# Patient Record
Sex: MALE | Race: BLACK OR AFRICAN AMERICAN | URBAN - NONMETROPOLITAN AREA
[De-identification: names, ages, dates, MRNs, and addresses within clinical notes are randomized per-mention and may not be internally consistent; named-entity substitution may affect disease eponyms.]

---

## 2019-09-04 ENCOUNTER — IMPORTED ENCOUNTER (OUTPATIENT)
Dept: URBAN - NONMETROPOLITAN AREA CLINIC 1 | Facility: CLINIC | Age: 59
End: 2019-09-04

## 2019-09-04 PROBLEM — E78.5: Noted: 2019-09-04

## 2019-09-04 PROBLEM — Z01.818: Noted: 2019-09-04

## 2019-09-04 PROBLEM — H25.812: Noted: 2019-09-04

## 2019-09-04 PROBLEM — I10: Noted: 2019-09-04

## 2019-09-04 PROCEDURE — 92004 COMPRE OPH EXAM NEW PT 1/>: CPT

## 2019-09-04 NOTE — PATIENT DISCUSSION
Medical Clearance-Medical clearance done today. -No outstanding concerns that would preclude surgery.-Patient is cleared to proceed with scheduled surgery.

## 2019-09-04 NOTE — PATIENT DISCUSSION
Cataract(s)-Visually significant cataract OU .-Cataract(s) causing symptomatic impairment of visual function not correctable with a tolerable change in glasses or contact lenses lighting or non-operative means resulting in specific activity limitations and/or participation restrictions including but not limited to reading viewing television driving or meeting vocational or recreational needs. -Expectation is clearer vision and functional improvement in symptoms as well as reduced glare disability after cataract removal.-Order IOLMaster and OPD today. -Recommend Standard IOL  based on today's OPD testing and lifestyle questionnaire.-All questions were answered regarding surgery including pre and post-op medications appointments activity restrictions and anesthetic usage.-The risks benefits and alternatives and special risk factors for the patient were discussed in detail including but not limited to: bleeding infection retinal detachment vitreous loss problems with the implant and possible need for additional surgery.-Although rare the possibility of complete vision loss was discussed.-The possible need for glasses post-operatively was discussed.-Order medical clearance exam based on history of -Patient elects to proceed with cataract surgery OS . Will schedule at patient's convenience and re-evaluate OD  in the future. Discussed all lens options in detail with patient and he wishes to proceed with standard IOL.

## 2019-09-17 ENCOUNTER — IMPORTED ENCOUNTER (OUTPATIENT)
Dept: URBAN - NONMETROPOLITAN AREA CLINIC 1 | Facility: CLINIC | Age: 59
End: 2019-09-17

## 2019-09-17 PROBLEM — H25.11: Noted: 2019-09-17

## 2019-09-17 PROBLEM — Z98.42: Noted: 2019-09-17

## 2019-09-17 PROCEDURE — 92012 INTRM OPH EXAM EST PATIENT: CPT

## 2019-09-17 PROCEDURE — 99024 POSTOP FOLLOW-UP VISIT: CPT

## 2019-09-17 NOTE — PATIENT DISCUSSION
Cataract(s)-Visually significant cataract OD . -Cataract(s) causing symptomatic impairment of visual function not correctable with a tolerable change in glasses or contact lenses lighting or non-operative means resulting in specific activity limitations and/or participation restrictions including but not limited to reading viewing television driving or meeting vocational or recreational needs. -Expectation is clearer vision and functional improvement in symptoms as well as reduced glare disability after cataract removal.-Recommend Standard/Traditional based on previous OPD testing and lifestyle questionnaire.-All questions were answered regarding surgery including pre and post-op medications appointments activity restrictions and anesthetic usage.-The risks benefits and alternatives and special risk factors for the patient were discussed in detail including but not limited to: bleeding infection retinal detachment vitreous loss problems with the implant and possible need for additional surgery.-Although rare the possibility of complete vision loss was discussed.-The need for glasses post-operatively was discussed.-Patient elects to proceed with cataract surgery OD . Will schedule at patient's convenience. s/p PCIOL OS-Pt doing well at 1 week s/p PCIOL. -Continue post-op gtts according to instruction sheet.-Okay to resume usual activites and d/c eye shield.

## 2019-10-28 ENCOUNTER — IMPORTED ENCOUNTER (OUTPATIENT)
Dept: URBAN - NONMETROPOLITAN AREA CLINIC 1 | Facility: CLINIC | Age: 59
End: 2019-10-28

## 2019-10-28 PROBLEM — E78.5: Noted: 2019-10-28

## 2019-10-28 PROBLEM — Z01.818: Noted: 2019-10-28

## 2019-10-28 PROBLEM — I10: Noted: 2019-10-28

## 2019-10-28 PROCEDURE — 99214 OFFICE O/P EST MOD 30 MIN: CPT

## 2022-04-10 ASSESSMENT — VISUAL ACUITY
OS_CC: CF4'
OS_PH: 20/400
OD_PAM: 20/30
OS_CC: 20/50
OD_CC: 20/63-2
OD_CC: 20/60-
OD_PAM: 20/30
OS_AM: 20/200
OD_PH: 20/50
OD_CC: 20/70
OS_PH: 20/32

## 2022-04-10 ASSESSMENT — TONOMETRY
OD_IOP_MMHG: 16
OS_IOP_MMHG: 16
OD_IOP_MMHG: 16
OS_IOP_MMHG: 16